# Patient Record
Sex: MALE | Race: OTHER | Employment: UNEMPLOYED
[De-identification: names, ages, dates, MRNs, and addresses within clinical notes are randomized per-mention and may not be internally consistent; named-entity substitution may affect disease eponyms.]

---

## 2024-06-29 ENCOUNTER — HOSPITAL ENCOUNTER (EMERGENCY)
Facility: HOSPITAL | Age: 49
Discharge: HOME | End: 2024-06-29
Attending: EMERGENCY MEDICINE

## 2024-06-29 VITALS
TEMPERATURE: 97.8 F | DIASTOLIC BLOOD PRESSURE: 76 MMHG | HEART RATE: 64 BPM | HEIGHT: 77 IN | SYSTOLIC BLOOD PRESSURE: 127 MMHG | RESPIRATION RATE: 16 BRPM | WEIGHT: 190 LBS | OXYGEN SATURATION: 98 % | BODY MASS INDEX: 22.43 KG/M2

## 2024-06-29 DIAGNOSIS — T78.40XA ALLERGIC REACTION, INITIAL ENCOUNTER: Primary | ICD-10-CM

## 2024-06-29 DIAGNOSIS — B37.42 CANDIDIASIS OF PENIS: ICD-10-CM

## 2024-06-29 LAB
APPEARANCE UR: CLEAR
BILIRUB UR QL: NEGATIVE
COLOR UR: YELLOW
GLUCOSE UR STRIP.AUTO-MCNC: NEGATIVE MG/DL
HGB UR QL STRIP: NEGATIVE
KETONES UR QL STRIP.AUTO: NEGATIVE MG/DL
LEUKOCYTE ESTERASE UR QL STRIP.AUTO: NEGATIVE
NITRITE UR QL STRIP.AUTO: NEGATIVE
PH UR STRIP: 5.5 (ref 5–8)
PROT UR STRIP-MCNC: NEGATIVE MG/DL
SP GR UR REFRACTOMETRY: 1.01 (ref 1–1.03)
UROBILINOGEN UR QL STRIP.AUTO: 0.2 EU/DL (ref 0.2–1)

## 2024-06-29 PROCEDURE — 6370000000 HC RX 637 (ALT 250 FOR IP): Performed by: EMERGENCY MEDICINE

## 2024-06-29 PROCEDURE — 99283 EMERGENCY DEPT VISIT LOW MDM: CPT

## 2024-06-29 PROCEDURE — 87661 TRICHOMONAS VAGINALIS AMPLIF: CPT

## 2024-06-29 PROCEDURE — 81003 URINALYSIS AUTO W/O SCOPE: CPT

## 2024-06-29 PROCEDURE — 87591 N.GONORRHOEAE DNA AMP PROB: CPT

## 2024-06-29 PROCEDURE — 87491 CHLMYD TRACH DNA AMP PROBE: CPT

## 2024-06-29 RX ORDER — FLUCONAZOLE 150 MG/1
150 TABLET ORAL
Qty: 2 TABLET | Refills: 0 | Status: SHIPPED | OUTPATIENT
Start: 2024-06-29 | End: 2024-07-05

## 2024-06-29 RX ORDER — DIPHENHYDRAMINE HCL 25 MG
25 CAPSULE ORAL
Status: COMPLETED | OUTPATIENT
Start: 2024-06-29 | End: 2024-06-29

## 2024-06-29 RX ORDER — DIPHENHYDRAMINE HCL 25 MG
25 CAPSULE ORAL EVERY 6 HOURS PRN
Qty: 30 CAPSULE | Refills: 0 | Status: SHIPPED | OUTPATIENT
Start: 2024-06-29 | End: 2024-07-09

## 2024-06-29 RX ORDER — FLUCONAZOLE 200 MG/1
200 TABLET ORAL
Status: COMPLETED | OUTPATIENT
Start: 2024-06-29 | End: 2024-06-29

## 2024-06-29 RX ADMIN — DIPHENHYDRAMINE HYDROCHLORIDE 25 MG: 25 CAPSULE ORAL at 23:03

## 2024-06-29 RX ADMIN — FLUCONAZOLE 200 MG: 200 TABLET ORAL at 23:03

## 2024-06-29 ASSESSMENT — ENCOUNTER SYMPTOMS
RESPIRATORY NEGATIVE: 1
GASTROINTESTINAL NEGATIVE: 1

## 2024-06-29 ASSESSMENT — PAIN - FUNCTIONAL ASSESSMENT: PAIN_FUNCTIONAL_ASSESSMENT: NONE - DENIES PAIN

## 2024-06-29 ASSESSMENT — LIFESTYLE VARIABLES
HOW MANY STANDARD DRINKS CONTAINING ALCOHOL DO YOU HAVE ON A TYPICAL DAY: PATIENT DOES NOT DRINK
HOW OFTEN DO YOU HAVE A DRINK CONTAINING ALCOHOL: NEVER

## 2024-06-30 NOTE — ED TRIAGE NOTES
Pt arrived via Triage ambulatory c/o itchiness to his face and a burning feeling for two weeks. Pt also states he is having dysuria for two weeks also.

## 2024-06-30 NOTE — ED PROVIDER NOTES
Allergic reaction    Penile candidiasis    DISPOSITION/PLAN     DISPOSITION  DC home      DISCHARGE MEDICATIONS: Diflucan, Benadryl, prednisone       PATIENT REFERRED TO: PCP in 5 days.  Return ER needed    (Please note that portions of this note were completed with a voice recognitionprogram.  Efforts were made to edit the dictations but occasionally words are mis-transcribed.)    Kendrick Mortensen MD(electronically signed)  Attending Emergency Physician          Kendrick Mortensen MD  07/01/24 [id number]

## 2024-07-01 LAB
C TRACH RRNA SPEC QL NAA+PROBE: NEGATIVE
N GONORRHOEA RRNA SPEC QL NAA+PROBE: NEGATIVE
SPECIMEN SOURCE: NORMAL
T VAGINALIS RRNA SPEC QL NAA+PROBE: NEGATIVE

## 2024-07-27 ENCOUNTER — HOSPITAL ENCOUNTER (EMERGENCY)
Facility: HOSPITAL | Age: 49
Discharge: LEFT BEFORE DISCHARGE | End: 2024-07-27

## 2024-07-27 VITALS
SYSTOLIC BLOOD PRESSURE: 123 MMHG | OXYGEN SATURATION: 100 % | HEIGHT: 67 IN | DIASTOLIC BLOOD PRESSURE: 86 MMHG | TEMPERATURE: 98.2 F | BODY MASS INDEX: 29.82 KG/M2 | RESPIRATION RATE: 18 BRPM | HEART RATE: 59 BPM | WEIGHT: 190 LBS

## 2024-07-27 LAB
APPEARANCE UR: CLEAR
BILIRUB UR QL: NEGATIVE
COLOR UR: YELLOW
GLUCOSE UR STRIP.AUTO-MCNC: NEGATIVE MG/DL
HGB UR QL STRIP: NEGATIVE
KETONES UR QL STRIP.AUTO: NEGATIVE MG/DL
LEUKOCYTE ESTERASE UR QL STRIP.AUTO: NEGATIVE
NITRITE UR QL STRIP.AUTO: NEGATIVE
PH UR STRIP: 6 (ref 5–8)
PROT UR STRIP-MCNC: NEGATIVE MG/DL
SP GR UR REFRACTOMETRY: 1.03 (ref 1–1.03)
UROBILINOGEN UR QL STRIP.AUTO: 1 EU/DL (ref 0.2–1)

## 2024-07-27 PROCEDURE — 81003 URINALYSIS AUTO W/O SCOPE: CPT

## 2024-07-27 PROCEDURE — 87591 N.GONORRHOEAE DNA AMP PROB: CPT

## 2024-07-27 PROCEDURE — 87661 TRICHOMONAS VAGINALIS AMPLIF: CPT

## 2024-07-27 PROCEDURE — 87491 CHLMYD TRACH DNA AMP PROBE: CPT

## 2024-07-27 ASSESSMENT — PAIN DESCRIPTION - DESCRIPTORS: DESCRIPTORS: BURNING

## 2024-07-27 ASSESSMENT — LIFESTYLE VARIABLES
HOW MANY STANDARD DRINKS CONTAINING ALCOHOL DO YOU HAVE ON A TYPICAL DAY: 1 OR 2
HOW OFTEN DO YOU HAVE A DRINK CONTAINING ALCOHOL: 2-3 TIMES A WEEK

## 2024-07-27 ASSESSMENT — PAIN DESCRIPTION - LOCATION: LOCATION: PENIS

## 2024-07-27 ASSESSMENT — PAIN SCALES - GENERAL: PAINLEVEL_OUTOF10: 6

## 2024-07-27 ASSESSMENT — PAIN DESCRIPTION - ORIENTATION: ORIENTATION: INNER

## 2024-07-27 ASSESSMENT — PAIN DESCRIPTION - PAIN TYPE: TYPE: ACUTE PAIN

## 2024-07-27 NOTE — ED TRIAGE NOTES
49 year old, male patient, alert and oriented and ambulatory from triage presenting with dysuria. Pt states that he has had pain with urination since a sexual encounter. Pt previously evaluated for the same. Pt describes the pain as burning and as a 6 of 10. Pt also states that he found 3 sore in his mouth under his tongue.

## 2024-07-28 NOTE — ED NOTES
Pt reports having to leave because his ride could not wait. Pt states \"I can just come back tomorrow\"

## 2024-08-27 ENCOUNTER — HOSPITAL ENCOUNTER (EMERGENCY)
Facility: HOSPITAL | Age: 49
Discharge: HOME | End: 2024-08-27
Attending: STUDENT IN AN ORGANIZED HEALTH CARE EDUCATION/TRAINING PROGRAM

## 2024-08-27 VITALS
HEART RATE: 52 BPM | TEMPERATURE: 98.9 F | DIASTOLIC BLOOD PRESSURE: 74 MMHG | BODY MASS INDEX: 29.82 KG/M2 | WEIGHT: 190 LBS | OXYGEN SATURATION: 98 % | HEIGHT: 67 IN | SYSTOLIC BLOOD PRESSURE: 108 MMHG | RESPIRATION RATE: 17 BRPM

## 2024-08-27 DIAGNOSIS — Z91.89 AT RISK FOR SEXUALLY TRANSMITTED DISEASE DUE TO UNPROTECTED SEX: Primary | ICD-10-CM

## 2024-08-27 DIAGNOSIS — R30.0 DYSURIA: ICD-10-CM

## 2024-08-27 DIAGNOSIS — K04.3: ICD-10-CM

## 2024-08-27 LAB
APPEARANCE UR: CLEAR
BILIRUB UR QL: NEGATIVE
COLOR UR: YELLOW
GLUCOSE UR STRIP.AUTO-MCNC: NEGATIVE MG/DL
HGB UR QL STRIP: NEGATIVE
KETONES UR QL STRIP.AUTO: NEGATIVE MG/DL
LEUKOCYTE ESTERASE UR QL STRIP.AUTO: NEGATIVE
NITRITE UR QL STRIP.AUTO: NEGATIVE
PH UR STRIP: 5 (ref 5–8)
PROT UR STRIP-MCNC: NEGATIVE MG/DL
SP GR UR REFRACTOMETRY: 1.01 (ref 1–1.03)
UROBILINOGEN UR QL STRIP.AUTO: 0.2 EU/DL (ref 0.2–1)

## 2024-08-27 PROCEDURE — 87491 CHLMYD TRACH DNA AMP PROBE: CPT

## 2024-08-27 PROCEDURE — 6360000002 HC RX W HCPCS: Performed by: STUDENT IN AN ORGANIZED HEALTH CARE EDUCATION/TRAINING PROGRAM

## 2024-08-27 PROCEDURE — 87591 N.GONORRHOEAE DNA AMP PROB: CPT

## 2024-08-27 PROCEDURE — 87661 TRICHOMONAS VAGINALIS AMPLIF: CPT

## 2024-08-27 PROCEDURE — 99284 EMERGENCY DEPT VISIT MOD MDM: CPT

## 2024-08-27 PROCEDURE — 6370000000 HC RX 637 (ALT 250 FOR IP): Performed by: STUDENT IN AN ORGANIZED HEALTH CARE EDUCATION/TRAINING PROGRAM

## 2024-08-27 PROCEDURE — 96372 THER/PROPH/DIAG INJ SC/IM: CPT

## 2024-08-27 PROCEDURE — 81003 URINALYSIS AUTO W/O SCOPE: CPT

## 2024-08-27 PROCEDURE — 2500000003 HC RX 250 WO HCPCS: Performed by: STUDENT IN AN ORGANIZED HEALTH CARE EDUCATION/TRAINING PROGRAM

## 2024-08-27 RX ORDER — DOXYCYCLINE HYCLATE 100 MG
100 TABLET ORAL 2 TIMES DAILY
Qty: 20 TABLET | Refills: 0 | Status: SHIPPED | OUTPATIENT
Start: 2024-08-27 | End: 2024-09-06

## 2024-08-27 RX ORDER — DOXYCYCLINE 100 MG/1
100 CAPSULE ORAL
Status: COMPLETED | OUTPATIENT
Start: 2024-08-27 | End: 2024-08-27

## 2024-08-27 RX ADMIN — LIDOCAINE HYDROCHLORIDE 500 MG: 10 INJECTION, SOLUTION EPIDURAL; INFILTRATION; INTRACAUDAL; PERINEURAL at 20:28

## 2024-08-27 RX ADMIN — DOXYCYCLINE HYCLATE 100 MG: 100 CAPSULE ORAL at 20:28

## 2024-08-27 NOTE — ED TRIAGE NOTES
Ambulatory pt c/o oral pain on the lower pallet and intermittent penile burning / burning with urination.

## 2024-08-28 NOTE — ED PROVIDER NOTES
but is not limited to: Gonorrhea, chlamydia, UTI, dental abnormality    Patient overall in no acute distress and vital signs grossly within normal limits.  Unclear etiology for patient's dental abnormality here in the emergency department.  Low suspicion for cancer or infection based off of history and physical exam.  Will empirically treat patient for a sexually transmitted infection with IM ceftriaxone and doxycycline.  Will recommend patient follow-up with general dentistry for further evaluation of his dental abnormality.  Will continue to monitor and evaluate patient while in the emergency department.      Orders as below:  Orders Placed This Encounter   Procedures    Chlamydia, Gonorrhea, Trichomoniasis    Urinalysis          ED Course:        Patient's UA negative for infection.  Will discharge patient home with strict return precautions and follow-up recommendations.  Patient verbalized understanding is without further questions.  Comfortable with plan.    Procedures:  Procedures      Rhythm interpretation from monitor: Sinus rhythm      Social Determinants of Health: No PCP       Supplemental Historians include: Patient       Documentation/Prior Results Review:  Old medical records.  Nursing notes.      Discussion of Mangement with other Physicians, QHP or Appropriate Source:      Diagnosis and Disposition     CLINICAL IMPRESSION:  1. At risk for sexually transmitted disease due to unprotected sex    2. Abnormal hard tissue formation in dental pulp    3. Dysuria         Medication List        START taking these medications      doxycycline hyclate 100 MG tablet  Commonly known as: VIBRA-TABS  Take 1 tablet by mouth 2 times daily for 10 days               Where to Get Your Medications        Information about where to get these medications is not yet available    Ask your nurse or doctor about these medications  doxycycline hyclate 100 MG tablet         Disposition: Home    Patient condition at time of  disposition: Stable    DISCHARGE NOTE:   Pt has been reexamined. Patient has no new complaints, changes, or physical findings.  Care plan outlined and precautions discussed.  Results were reviewed with the patient. All medications were reviewed with the patient. All of pt's questions and concerns were addressed.  Alarm symptoms and return precautions associated with chief complaint and evaluation were reviewed with the patient in detail.  The patient demonstrated adequate understanding.  Patient was instructed to follow up with PCP, as well as given strict return precautions to the ED upon further deterioration. Patient is ready for discharge home.          Dragon Disclaimer     Please note that this dictation was completed with [employer], the computer voice recognition software.  Quite often unanticipated grammatical, syntax, homophones, and other interpretive errors are inadvertently transcribed by the computer software.  Please disregard these errors.  Please excuse any errors that have escaped final proofreading.      Perez Mckeon Jr., DO  08/28/24 0057

## 2024-08-28 NOTE — DISCHARGE INSTRUCTIONS
While taking antibiotics he should not have unprotected intercourse over the next 2 weeks.  You should also have your sexual partner evaluated and checked for sexually transmitted diseases as well.      Take all antibiotics as scheduled until they are completely finished, even if you start to feel better sooner. The antibiotics you were given will help reduce the infection and resulting pain, but your dental problem will not go away without the help of a dentist.  We are not dentists.  Fever and facial swelling are indications of worsening that would require you to see a dentist immediately.      To find a listing of dental clinics in [facility], visit [email or web address].  If you need assistance finding dental services, call [facility]’s Dental Access Line at [phone] ext. [phone].       [facility] in [facility] in alphabetical order      [facility]  [address] [place] [address] [phone]  [email or web address]  Services: Dental exams, x-rays, cleanings, restorations (fillings) and extractions  Eligibility: Uninsured residents of [place] with household incomes below 200% of federal poverty. Proof of income & residency required.  Fees: Minimal cost to qualified individuals       [facility]  [address] [place], [place]  [phone]  [email or web address]    Services: Dental exams, x-rays, cleanings, restorations (fillings) and extractions  Eligibility: Uninsured residents of [facility] with household incomes below 200% of federal poverty. Proof of income & residency required.  Fees: $20         [facility] [address] [address] [phone]  [email or web address]  Services: Dental Exams, Cleanings, Fluoride Treatments, Dental Sealant, Dental  Fillings, Dental Extractions, Root Canals, Partial and Full Dentures, Other treatment as indicated by the Attending Dentist. The [employer] offers: Preventive and Restorative Dental Care in the \"Healthy Smiles Mobile\", Dental Screening, Health Fair Participation, Classroom Education  Eligibility: Residents of [facility]. Children enrolled in the school lunch program are automatically eligible for services. Proof of income & residency required.  Fees: Most insurance plans, including Medicaid, Medicaid HMO's and FAMIS are accepted. Uninsured pay sliding scale fees according to income level.       [facility] [place]  [address] [place] [address] [phone]  [email or web address]  Services: Dental Exams, Cleanings, Fluoride Treatments, Dental Sealant, Dental Fillings, Dental Extractions, Root Canals, Partial and Full Dentures, Other treatment as indicated by the Attending Dentist. The [employer] Program offers: Preventive and Restorative Dental Care in the \"Healthy Smiles Mobile\", Dental Screening, Health Fair Participation, Classroom Education  Eligibility: Residents of [facility]. Children enrolled in the school lunch program are automatically eligible for services. Proof of income & residency required.  Fees: Most insurance plans, including Medicaid, Medicaid HMO's and FAMIS are accepted. Uninsured pay sliding scale fees according to income level.         [facility], [facility].  [facility]  [address] [place] [address]  [address] [phone]  [email or web address]  Monday - Friday  8:15am - 5:00pm  How to Access Services: Contact the office;  application; make an appointment; income restrictions apply.          [facility]  [address] [address] [phone]  [email or web address]  Services: Hygiene (cleanings), Restorations (fillings), and Extractions.  Eligibility: [place] residents, 18 years

## 2024-08-29 ENCOUNTER — HOSPITAL ENCOUNTER (EMERGENCY)
Facility: HOSPITAL | Age: 49
Discharge: HOME | End: 2024-08-29
Attending: EMERGENCY MEDICINE

## 2024-08-29 VITALS
DIASTOLIC BLOOD PRESSURE: 68 MMHG | WEIGHT: 196 LBS | HEART RATE: 98 BPM | TEMPERATURE: 98.9 F | OXYGEN SATURATION: 95 % | RESPIRATION RATE: 16 BRPM | BODY MASS INDEX: 30.76 KG/M2 | HEIGHT: 67 IN | SYSTOLIC BLOOD PRESSURE: 100 MMHG

## 2024-08-29 DIAGNOSIS — Z20.2 EXPOSURE TO STD: Primary | ICD-10-CM

## 2024-08-29 PROCEDURE — 99282 EMERGENCY DEPT VISIT SF MDM: CPT

## 2024-08-29 ASSESSMENT — PAIN - FUNCTIONAL ASSESSMENT: PAIN_FUNCTIONAL_ASSESSMENT: NONE - DENIES PAIN

## 2024-08-29 ASSESSMENT — LIFESTYLE VARIABLES
HOW MANY STANDARD DRINKS CONTAINING ALCOHOL DO YOU HAVE ON A TYPICAL DAY: 5 OR 6
HOW OFTEN DO YOU HAVE A DRINK CONTAINING ALCOHOL: 2-4 TIMES A MONTH

## 2024-08-30 NOTE — ED TRIAGE NOTES
Pt states that he has had urinary frequency and a burning sensation in his penis for 3 months. He is concerned he has diabetes because of the frequency and he had negative STD check.

## 2024-08-30 NOTE — ED NOTES
I have reviewed discharge instruction with patient.  Patient verbalized understanding and has no further questions at this time.    Education taught and patient verbalized understanding of education.  Teach back method used.    Patient discharged ambulatory to home with paperwork in hand.

## 2024-08-30 NOTE — ED PROVIDER NOTES
`[facility] EMERGENCY DEPT  eMERGENCY dEPARTMENT eNCOUnter      Pt Name: Micah Reyes  MRN: [id number]  Birthdate 1975 of evaluation: 8/29/2024  Provider:Kendrick Mortensen MD    CHIEF COMPLAINT         HPI    Micah Reyes is a 49 y.o. male  was seen in the ER couple days ago for possible venous process STD.  He presents today for results of his STD studies.  No complaints    ROS    Review of Systems   Genitourinary:  Negative for dysuria, flank pain, genital sores, penile discharge, penile pain, penile swelling and testicular pain.   All other systems reviewed and are negative.      Except as noted above the remainder of the review of systems was reviewed and negative.       PAST MEDICAL HISTORY   History reviewed. No pertinent past medical history.      SURGICAL HISTORY     History reviewed. No pertinent surgical history.      CURRENTMEDICATIONS       Previous Medications    DOXYCYCLINE HYCLATE (VIBRA-TABS) 100 MG TABLET    Take 1 tablet by mouth 2 times daily for 10 days       ALLERGIES     Patient has no known allergies.    FAMILY HISTORY     History reviewed. No pertinent family history.       SOCIAL HISTORY       Social History     Socioeconomic History    Marital status:      Spouse name: None    Number of children: None    Years of education: None    Highest education level: None   Tobacco Use    Smoking status: Never    Smokeless tobacco: Never   Substance and Sexual Activity    Alcohol use: Yes     Comment: 6-12         PHYSICAL EXAM       ED Triage Vitals [08/29/24 2108]   BP Systolic BP Percentile Diastolic BP Percentile Temp Temp src Pulse Respirations SpO2   100/68 -- -- 98.9 °F (37.2 °C) -- 98 16 95 %      Height Weight - Scale         1.702 m (5' 7\") 88.9 kg (196 lb)             Physical Exam  Constitutional:       Appearance: Normal appearance.   Cardiovascular:      Rate and Rhythm: Normal rate.   Pulmonary:      Effort: Pulmonary effort is normal.   Abdominal:      Palpations: Abdomen is soft.    Neurological:      Mental Status: He is alert.     : normal penis, no discharge    No results found for this or any previous visit (from the past 24 hour(s)).          PROCEDURES:      EMERGENCY DEPARTMENT COURSE and DIFFERENTIALDIAGNOSIS/ MDM:   Vitals:    Vitals:    08/29/24 2108   BP: 100/68   Pulse: 98   Resp: 16   Temp: 98.9 °F (37.2 °C)   SpO2: 95%   Weight: 88.9 kg (196 lb)   Height: 1.702 m (5' 7\")       MDM  Number of Diagnoses or Management Options  Exposure to STD  Diagnosis management comments: Differential diagnosis: Gonorrhea, GC, BV trichomonas UTI    Risk of Complications, Morbidity, and/or Mortality  Presenting problems: low  Diagnostic procedures: low  Management options: low        No orders to display         10:02 PM  Upon re-evaluation the patient's symptoms have improved. Pt has non-toxic appearance and condition is stable for discharge. Patient was informed of tests & results, instructed to f/u with PCP and return to the ED upon worsening of symptoms. All questions and concerns were addressed.       Procedures    FINAL IMPRESSION      1. Exposure to STD          DISPOSITION/PLAN   DISPOSITION Decision To Discharge 08/29/2024 09:59:10 PM    Condition at Disposition: Data Unavailable    DISCHARGE MEDICATIONS: Doxycycline    PATIENT REFERRED TO:  [place] [address]  [phone]  In 1 week  As needed        (Please note that portions of this note were completed with a voice recognitionprogram.  Efforts were made to edit the dictations but occasionally words are mis-transcribed.)    Kendrick Mortensen MD(electronically signed)  Attending Emergency Physician          Kendrick Mortensen MD  08/31/24 0614